# Patient Record
Sex: FEMALE | Race: BLACK OR AFRICAN AMERICAN | URBAN - METROPOLITAN AREA
[De-identification: names, ages, dates, MRNs, and addresses within clinical notes are randomized per-mention and may not be internally consistent; named-entity substitution may affect disease eponyms.]

---

## 2022-04-30 ENCOUNTER — TELEPHONE ENCOUNTER (OUTPATIENT)
Dept: URBAN - METROPOLITAN AREA CLINIC 121 | Facility: CLINIC | Age: 82
End: 2022-04-30

## 2022-05-01 ENCOUNTER — TELEPHONE ENCOUNTER (OUTPATIENT)
Dept: URBAN - METROPOLITAN AREA CLINIC 121 | Facility: CLINIC | Age: 82
End: 2022-05-01

## 2024-03-12 ENCOUNTER — OV NP (OUTPATIENT)
Dept: URBAN - METROPOLITAN AREA CLINIC 84 | Facility: CLINIC | Age: 84
End: 2024-03-12
Payer: COMMERCIAL

## 2024-03-12 VITALS
TEMPERATURE: 97.3 F | HEART RATE: 70 BPM | HEIGHT: 60 IN | BODY MASS INDEX: 28.9 KG/M2 | DIASTOLIC BLOOD PRESSURE: 73 MMHG | SYSTOLIC BLOOD PRESSURE: 147 MMHG | WEIGHT: 147.2 LBS

## 2024-03-12 DIAGNOSIS — K62.89 RECTAL PAIN: ICD-10-CM

## 2024-03-12 DIAGNOSIS — R14.1 ABDOMINAL GAS PAIN: ICD-10-CM

## 2024-03-12 PROCEDURE — 99204 OFFICE O/P NEW MOD 45 MIN: CPT

## 2024-03-12 RX ORDER — BENZONATATE 100 MG/1
TAKE 1 CAPSULE BY MOUTH THREE TIMES A DAY AS NEEDED FOR 5 DAYS CAPSULE ORAL
Qty: 15 EACH | Refills: 0 | Status: ON HOLD | COMMUNITY

## 2024-03-12 RX ORDER — DICYCLOMINE HYDROCHLORIDE 20 MG/1
1 TABLET TABLET ORAL
Qty: 90 | OUTPATIENT
Start: 2024-03-12 | End: 2024-04-11

## 2024-03-12 RX ORDER — LEVOFLOXACIN 500 MG/1
TAKE 1 TABLET BY MOUTH EVERY DAY FOR 5 DAYS TABLET, FILM COATED ORAL
Qty: 5 EACH | Refills: 0 | Status: ACTIVE | COMMUNITY

## 2024-03-12 RX ORDER — LIDOCAINE HYDROCHLORIDE 20 MG/ML
SOLUTION ORAL; TOPICAL
Qty: 200 MILLILITER | Status: ON HOLD | COMMUNITY

## 2024-03-12 RX ORDER — ACETAMINOPHEN 500 MG/1
TAKE 1 TABLET BY MOUTH EVERY 6 HOURS AS NEEDED FOR 5 DAYS TABLET, FILM COATED ORAL
Qty: 20 EACH | Refills: 0 | Status: ON HOLD | COMMUNITY

## 2024-03-12 RX ORDER — OSELTAMIVIR 75 MG/1
TAKE 1 CAPSULE BY MOUTH TWICE A DAY FOR 5 DAYS CAPSULE ORAL
Qty: 10 EACH | Refills: 0 | Status: ON HOLD | COMMUNITY

## 2024-03-12 RX ORDER — BLOOD-GLUCOSE SENSOR
USE AS DIRECTED EACH MISCELLANEOUS
Qty: 2 EACH | Refills: 2 | Status: ON HOLD | COMMUNITY

## 2024-03-12 RX ORDER — NITROGLYCERIN 4 MG/G
AS DIRECTED OINTMENT RECTAL DAILY
Qty: 30 GRAM | Refills: 0 | OUTPATIENT
Start: 2024-03-12 | End: 2024-04-11

## 2024-03-12 RX ORDER — LIDOCAINE HYDROCHLORIDE 20 MG/ML
GARGLE AND SPIT A SMALL AMOUNT EVERY 4 TO 6 HOURS AS NEEDED SOLUTION ORAL; TOPICAL
Qty: 200 MILLILITER | Refills: 0 | Status: ON HOLD | COMMUNITY

## 2024-03-12 RX ORDER — OSELTAMIVIR 75 MG/1
CAPSULE ORAL
Qty: 10 CAPSULE | Status: ON HOLD | COMMUNITY

## 2024-03-12 RX ORDER — SEMAGLUTIDE 2.68 MG/ML
INJECTION, SOLUTION SUBCUTANEOUS
Qty: 3 MILLILITER | Status: ON HOLD | COMMUNITY

## 2024-03-12 RX ORDER — LEVOTHYROXINE SODIUM 88 UG/1
TAKE 1 TABLET BY MOUTH EVERY DAY ON EMPTY STOMACH IN THE MORNING TABLET ORAL
Qty: 90 EACH | Refills: 1 | Status: ON HOLD | COMMUNITY

## 2024-03-12 NOTE — HPI-TODAY'S VISIT:
03/24 OV  Ms. Hess is an 84y F, she presents today w/ complaints of lower pelvic and rectal pain, the patient admits to increased gas, lower abdominal discomfort radiates, and abdominal discomfort and gas symptoms developed following a cold 3 weeks ago, no change in bowels, normal bowel habitus. Pain is described as gas pain that feels like a cramp, pain is constant, and standing and leaning forward makes the pain worse. Also notes associated back pain. Deneis BRBPR, melena, unintentional weight loss. Rectal pain worsens after a BM, denies hard stools or straining. Has tried lidocaine ointment and Recticare w/ little efficacy, Gasx has not worked as well.

## 2024-04-23 ENCOUNTER — OV EP (OUTPATIENT)
Dept: URBAN - METROPOLITAN AREA CLINIC 84 | Facility: CLINIC | Age: 84
End: 2024-04-23